# Patient Record
Sex: FEMALE | Race: WHITE | Employment: PART TIME | URBAN - METROPOLITAN AREA
[De-identification: names, ages, dates, MRNs, and addresses within clinical notes are randomized per-mention and may not be internally consistent; named-entity substitution may affect disease eponyms.]

---
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
## 2022-03-19 PROBLEM — F50.02 ANOREXIA NERVOSA, BINGE EATING/PURGING TYPE: Status: ACTIVE | Noted: 2020-10-28

## 2022-03-19 PROBLEM — K76.0 FATTY LIVER: Status: ACTIVE | Noted: 2020-10-28

## 2022-03-20 PROBLEM — F32.A ADOLESCENT DEPRESSION: Status: ACTIVE | Noted: 2020-10-28

## 2022-03-20 PROBLEM — K80.20 GALLSTONES: Status: ACTIVE | Noted: 2020-10-28

## 2022-03-30 ENCOUNTER — APPOINTMENT (OUTPATIENT)
Dept: ULTRASOUND IMAGING | Age: 22
End: 2022-03-30
Attending: PHYSICIAN ASSISTANT
Payer: MEDICAID

## 2022-03-30 ENCOUNTER — HOSPITAL ENCOUNTER (EMERGENCY)
Age: 22
Discharge: HOME | End: 2022-03-31
Attending: STUDENT IN AN ORGANIZED HEALTH CARE EDUCATION/TRAINING PROGRAM
Payer: MEDICAID

## 2022-03-30 ENCOUNTER — APPOINTMENT (OUTPATIENT)
Dept: CT IMAGING | Age: 22
End: 2022-03-30
Attending: PHYSICIAN ASSISTANT
Payer: MEDICAID

## 2022-03-30 VITALS
OXYGEN SATURATION: 99 % | HEART RATE: 87 BPM | DIASTOLIC BLOOD PRESSURE: 67 MMHG | WEIGHT: 145.06 LBS | RESPIRATION RATE: 16 BRPM | BODY MASS INDEX: 26.69 KG/M2 | SYSTOLIC BLOOD PRESSURE: 105 MMHG | TEMPERATURE: 98.4 F | HEIGHT: 62 IN

## 2022-03-30 DIAGNOSIS — R79.89 ELEVATED LFTS: ICD-10-CM

## 2022-03-30 DIAGNOSIS — R16.1 SPLENOMEGALY: ICD-10-CM

## 2022-03-30 DIAGNOSIS — B27.90 INFECTIOUS MONONUCLEOSIS WITHOUT COMPLICATION, INFECTIOUS MONONUCLEOSIS DUE TO UNSPECIFIED ORGANISM: Primary | ICD-10-CM

## 2022-03-30 LAB
ALBUMIN SERPL-MCNC: 3.3 G/DL (ref 3.5–5)
ALBUMIN/GLOB SERPL: 1 {RATIO} (ref 1.1–2.2)
ALP SERPL-CCNC: 140 U/L (ref 45–117)
ALT SERPL-CCNC: 176 U/L (ref 12–78)
ANION GAP SERPL CALC-SCNC: 6 MMOL/L (ref 5–15)
APPEARANCE UR: ABNORMAL
AST SERPL-CCNC: 129 U/L (ref 15–37)
BACTERIA URNS QL MICRO: ABNORMAL /HPF
BASOPHILS # BLD: 0.1 K/UL (ref 0–0.1)
BASOPHILS NFR BLD: 1 % (ref 0–1)
BILIRUB SERPL-MCNC: 1 MG/DL (ref 0.2–1)
BILIRUB UR QL CFM: NEGATIVE
BUN SERPL-MCNC: 7 MG/DL (ref 6–20)
BUN/CREAT SERPL: 13 (ref 12–20)
CALCIUM SERPL-MCNC: 8.6 MG/DL (ref 8.5–10.1)
CHLORIDE SERPL-SCNC: 107 MMOL/L (ref 97–108)
CO2 SERPL-SCNC: 27 MMOL/L (ref 21–32)
COLOR UR: ABNORMAL
COMMENT, HOLDF: NORMAL
CREAT SERPL-MCNC: 0.56 MG/DL (ref 0.55–1.02)
DIFFERENTIAL METHOD BLD: ABNORMAL
EOSINOPHIL # BLD: 0 K/UL (ref 0–0.4)
EOSINOPHIL NFR BLD: 0 % (ref 0–7)
EPITH CASTS URNS QL MICRO: ABNORMAL /LPF
ERYTHROCYTE [DISTWIDTH] IN BLOOD BY AUTOMATED COUNT: 12.5 % (ref 11.5–14.5)
GLOBULIN SER CALC-MCNC: 3.4 G/DL (ref 2–4)
GLUCOSE SERPL-MCNC: 81 MG/DL (ref 65–100)
GLUCOSE UR STRIP.AUTO-MCNC: NEGATIVE MG/DL
HCG SERPL QL: NEGATIVE
HCT VFR BLD AUTO: 36.7 % (ref 35–47)
HGB BLD-MCNC: 12.4 G/DL (ref 11.5–16)
HGB UR QL STRIP: NEGATIVE
IMM GRANULOCYTES # BLD AUTO: 0 K/UL
IMM GRANULOCYTES NFR BLD AUTO: 0 %
KETONES UR QL STRIP.AUTO: ABNORMAL MG/DL
LEUKOCYTE ESTERASE UR QL STRIP.AUTO: ABNORMAL
LIPASE SERPL-CCNC: 84 U/L (ref 73–393)
LYMPHOCYTES # BLD: 5.2 K/UL (ref 0.8–3.5)
LYMPHOCYTES NFR BLD: 70 % (ref 12–49)
MCH RBC QN AUTO: 30.6 PG (ref 26–34)
MCHC RBC AUTO-ENTMCNC: 33.8 G/DL (ref 30–36.5)
MCV RBC AUTO: 90.6 FL (ref 80–99)
MONOCYTES # BLD: 0.4 K/UL (ref 0–1)
MONOCYTES NFR BLD: 6 % (ref 5–13)
MUCOUS THREADS URNS QL MICRO: ABNORMAL /LPF
MYELOCYTES NFR BLD MANUAL: 1 %
NEUTS BAND NFR BLD MANUAL: 1 % (ref 0–6)
NEUTS SEG # BLD: 1.6 K/UL (ref 1.8–8)
NEUTS SEG NFR BLD: 21 % (ref 32–75)
NITRITE UR QL STRIP.AUTO: NEGATIVE
NRBC # BLD: 0 K/UL (ref 0–0.01)
NRBC BLD-RTO: 0 PER 100 WBC
PH UR STRIP: 6 [PH] (ref 5–8)
PLATELET # BLD AUTO: 178 K/UL (ref 150–400)
PMV BLD AUTO: 11.4 FL (ref 8.9–12.9)
POTASSIUM SERPL-SCNC: 3.4 MMOL/L (ref 3.5–5.1)
PROT SERPL-MCNC: 6.7 G/DL (ref 6.4–8.2)
PROT UR STRIP-MCNC: 30 MG/DL
RBC # BLD AUTO: 4.05 M/UL (ref 3.8–5.2)
RBC #/AREA URNS HPF: ABNORMAL /HPF (ref 0–5)
RBC MORPH BLD: ABNORMAL
SAMPLES BEING HELD,HOLD: NORMAL
SODIUM SERPL-SCNC: 140 MMOL/L (ref 136–145)
SP GR UR REFRACTOMETRY: 1.02 (ref 1–1.03)
UROBILINOGEN UR QL STRIP.AUTO: 1 EU/DL (ref 0.2–1)
WBC # BLD AUTO: 7.4 K/UL (ref 3.6–11)
WBC MORPH BLD: ABNORMAL
WBC URNS QL MICRO: ABNORMAL /HPF (ref 0–4)

## 2022-03-30 PROCEDURE — 74011250637 HC RX REV CODE- 250/637: Performed by: PHYSICIAN ASSISTANT

## 2022-03-30 PROCEDURE — 85025 COMPLETE CBC W/AUTO DIFF WBC: CPT

## 2022-03-30 PROCEDURE — 76705 ECHO EXAM OF ABDOMEN: CPT

## 2022-03-30 PROCEDURE — 86308 HETEROPHILE ANTIBODY SCREEN: CPT

## 2022-03-30 PROCEDURE — 74011250636 HC RX REV CODE- 250/636: Performed by: PHYSICIAN ASSISTANT

## 2022-03-30 PROCEDURE — 99285 EMERGENCY DEPT VISIT HI MDM: CPT

## 2022-03-30 PROCEDURE — 96361 HYDRATE IV INFUSION ADD-ON: CPT

## 2022-03-30 PROCEDURE — 36415 COLL VENOUS BLD VENIPUNCTURE: CPT

## 2022-03-30 PROCEDURE — 84703 CHORIONIC GONADOTROPIN ASSAY: CPT

## 2022-03-30 PROCEDURE — 96360 HYDRATION IV INFUSION INIT: CPT

## 2022-03-30 PROCEDURE — 81001 URINALYSIS AUTO W/SCOPE: CPT

## 2022-03-30 PROCEDURE — 83690 ASSAY OF LIPASE: CPT

## 2022-03-30 PROCEDURE — 74177 CT ABD & PELVIS W/CONTRAST: CPT

## 2022-03-30 PROCEDURE — 80053 COMPREHEN METABOLIC PANEL: CPT

## 2022-03-30 RX ORDER — ACETAMINOPHEN 325 MG/1
650 TABLET ORAL
Status: COMPLETED | OUTPATIENT
Start: 2022-03-30 | End: 2022-03-30

## 2022-03-30 RX ADMIN — ACETAMINOPHEN 650 MG: 325 TABLET ORAL at 23:50

## 2022-03-30 RX ADMIN — SODIUM CHLORIDE 1000 ML: 9 INJECTION, SOLUTION INTRAVENOUS at 23:50

## 2022-03-30 NOTE — LETTER
[address]  [facility] EMERGENCY DEPT  [address] [id number]  [phone]    Work/School Note    Date: 3/30/2022    To Whom It May concern:    Nikolai Christianson was seen and treated today in the emergency room by the following provider(s):  Attending Provider: Jaida Riley MD  Physician Assistant: Deidre Conner. Nikolai Christianson should not return to gym class or sport until cleared by physician.     Sincerely,            Joann Del Valle PA-C

## 2022-03-30 NOTE — ED TRIAGE NOTES
Pt presents to ER with c/o RLQ abdominal pain x2 days with n/v/d. Pt reports recent GI illness 2 weeks ago that resolved, but has no returned and the abdominal pain is new. Pt has chills and fever x2 days. Denies urinary sx. Pt still has appendix. Seen at [facility] and had bloodwork done with NL urine and CBC, K+ 2.9.

## 2022-03-30 NOTE — LETTER
[address]  [facility] EMERGENCY DEPT  [address] [phone]  [phone]    Work/School Note    Date: 3/30/2022    To Whom It May concern:    Dylon Humphreys was seen and treated today in the emergency room by the following provider(s):  Attending Provider: Jordan Santiago MD  Physician Assistant: Deidre Kessler. Dylon Humphreys may return to work on 4/11/2022.     Sincerely,          Dee Dee Mcdonald PA-C

## 2022-03-31 LAB — HETEROPH AB BLD QL IA: POSITIVE

## 2022-03-31 PROCEDURE — 74011000636 HC RX REV CODE- 636: Performed by: RADIOLOGY

## 2022-03-31 RX ADMIN — IOPAMIDOL 100 ML: 755 INJECTION, SOLUTION INTRAVENOUS at 00:07

## 2022-03-31 NOTE — DISCHARGE INSTRUCTIONS
You should not perform in any contact sports or attend gym class until you have been cleared by your pediatrician given your enlarged spleen on your CT scan. If you start having onset of left upper quadrant abdominal pain, worsening abdominal pain, nausea or vomiting, please come to ED for evaluation. If you are involved in an MVA, please come to ED for evaluation.

## 2022-03-31 NOTE — ED PROVIDER NOTES
Leeanne Saba is a 25 y.o. female without major PMhx who presents to ED with cc of 6/10 abdominal pain, right-sided X 2 days. Patient endorses having had nausea and vomiting last week, improvement through the weekend and then return of symptoms over the past 2 days. States she started then with nausea 2 days ago and now notes right-sided abdominal pain. Endorses diarrhea. Endorses also having had a fever at home 2 days ago, had none yesterday. Denies nausea at this time. Mother endorses history of known gallstones secondary to eating disorder 3 years prior. Patient denies history of appendectomy or cholecystectomy. Denies abnormal vaginal bleeding or vaginal discharge. Pt denies additional symptoms of cough, congestion, CP, SOB. PMHx: Reviewed, as below. PSHx: Reviewed, as below. PCP: Moi Dempsey MD    There are no other complaints verbalized at this time. No past medical history on file. No past surgical history on file. No family history on file. Social History     Socioeconomic History    Marital status: SINGLE     Spouse name: Not on file    Number of children: Not on file    Years of education: Not on file    Highest education level: Not on file   Occupational History    Not on file   Tobacco Use    Smoking status: Never Smoker    Smokeless tobacco: Never Used   Substance and Sexual Activity    Alcohol use: No    Drug use: Never    Sexual activity: Never   Other Topics Concern    Not on file   Social History Narrative    Not on file     Social Determinants of Health     Financial Resource Strain:     Difficulty of Paying Living Expenses: Not on file   Food Insecurity:     Worried About Running Out of Food in the Last Year: Not on file    Nataliya of Food in the Last Year: Not on file   Transportation Needs:     Lack of Transportation (Medical): Not on file    Lack of Transportation (Non-Medical):  Not on file   Physical Activity:     Days of Exercise per Week: Not on file    Minutes of Exercise per Session: Not on file   Stress:     Feeling of Stress : Not on file   Social Connections:     Frequency of Communication with Friends and Family: Not on file    Frequency of Social Gatherings with Friends and Family: Not on file    Attends Spiritism Services: Not on file    Active Member of [address] [place] [address] [employer] or Organizations: Not on file    Attends Club or Organization Meetings: Not on file    Marital Status: Not on file   Intimate Partner Violence:     Fear of Current or Ex-Partner: Not on file    Emotionally Abused: Not on file    Physically Abused: Not on file    Sexually Abused: Not on file   Housing Stability:     Unable to Pay for Housing in the Last Year: Not on file    Number of [place] in the Last Year: Not on file    Unstable Housing in the Last Year: Not on file         ALLERGIES: Patient has no known allergies. Review of Systems   Constitutional: Negative for fever. HENT: Negative for congestion. Respiratory: Negative for cough and shortness of breath. Cardiovascular: Negative for chest pain. Gastrointestinal: Positive for abdominal pain, diarrhea, nausea and vomiting. Negative for constipation. Genitourinary: Negative for dysuria, frequency, vaginal bleeding and vaginal discharge. All other systems reviewed and are negative. Vitals:    03/30/22 1845 03/30/22 2144 03/30/22 2353   BP: 106/74 97/63 105/67   Pulse: 100 100 87   Resp: 17 16 16   Temp: 99.5 °F (37.5 °C) 98.1 °F (36.7 °C) 98.4 °F (36.9 °C)   SpO2: 98% 98% 99%   Weight: 65.8 kg (145 lb 1 oz)     Height: 5' 2\" (1.575 m)              Physical Exam  Vitals and nursing note reviewed. Constitutional:       Appearance: Normal appearance. She is not diaphoretic. HENT:      Head: Atraumatic.       Right Ear: External ear normal.      Left Ear: External ear normal.   Eyes:      Conjunctiva/sclera: Conjunctivae normal.   Cardiovascular:      Rate and Rhythm: Normal rate and regular rhythm. Heart sounds: Normal heart sounds. No murmur heard. No friction rub. No gallop. Pulmonary:      Effort: No respiratory distress. Breath sounds: Normal breath sounds. No stridor. No wheezing, rhonchi or rales. Abdominal:      General: Bowel sounds are normal. There is no distension. Palpations: Abdomen is soft. Tenderness: There is abdominal tenderness in the right upper quadrant, right lower quadrant, epigastric area, periumbilical area and suprapubic area. There is no guarding or rebound. Hernia: No hernia is present. Musculoskeletal:         General: No swelling. Cervical back: Normal range of motion. No rigidity. Skin:     General: Skin is warm and dry. Neurological:      Mental Status: She is alert and oriented to person, place, and time. Mental status is at baseline.           MDM  Number of Diagnoses or Management Options     Amount and/or Complexity of Data Reviewed  Clinical lab tests: ordered and reviewed  Tests in the radiology section of CPT®: ordered and reviewed  Obtain history from someone other than the patient: yes (Mother)  Discuss the patient with other providers: yes (Dr Johan Lee, ED attending)           Procedures               VITAL SIGNS:  Vitals:    03/30/22 1845 03/30/22 2144 03/30/22 2353   BP: 106/74 97/63 105/67   Pulse: 100 100 87   Resp: 17 16 16   Temp: 99.5 °F (37.5 °C) 98.1 °F (36.7 °C) 98.4 °F (36.9 °C)   SpO2: 98% 98% 99%   Weight: 65.8 kg (145 lb 1 oz)     Height: 5' 2\" (1.575 m)           LABS:  Recent Results (from the past 24 hour(s))   CBC WITH AUTOMATED DIFF    Collection Time: 03/30/22  7:51 PM   Result Value Ref Range    WBC 7.4 3.6 - 11.0 K/uL    RBC 4.05 3.80 - 5.20 M/uL    HGB 12.4 11.5 - 16.0 g/dL    HCT 36.7 35.0 - 47.0 %    MCV 90.6 80.0 - 99.0 FL    MCH 30.6 26.0 - 34.0 PG    MCHC 33.8 30.0 - 36.5 g/dL    RDW 12.5 11.5 - 14.5 %    PLATELET 280 591 - 135 K/uL    MPV 11.4 8.9 - 12.9 FL    NRBC 0.0 0 PER 100 WBC    ABSOLUTE NRBC 0.00 0.00 - 0.01 K/uL    NEUTROPHILS 21 (L) 32 - 75 %    BAND NEUTROPHILS 1 0 - 6 %    LYMPHOCYTES 70 (H) 12 - 49 %    MONOCYTES 6 5 - 13 %    EOSINOPHILS 0 0 - 7 %    BASOPHILS 1 0 - 1 %    MYELOCYTES 1 (H) 0 %    IMMATURE GRANULOCYTES 0 %    ABS. NEUTROPHILS 1.6 (L) 1.8 - 8.0 K/UL    ABS. LYMPHOCYTES 5.2 (H) 0.8 - 3.5 K/UL    ABS. MONOCYTES 0.4 0.0 - 1.0 K/UL    ABS. EOSINOPHILS 0.0 0.0 - 0.4 K/UL    ABS. BASOPHILS 0.1 0.0 - 0.1 K/UL    ABS. IMM. GRANS. 0.0 K/UL    DF MANUAL      RBC COMMENTS NORMOCYTIC, NORMOCHROMIC      WBC COMMENTS ATYPICAL LYMPHOCYTES PRESENT     METABOLIC PANEL, COMPREHENSIVE    Collection Time: 03/30/22  7:51 PM   Result Value Ref Range    Sodium 140 136 - 145 mmol/L    Potassium 3.4 (L) 3.5 - 5.1 mmol/L    Chloride 107 97 - 108 mmol/L    CO2 27 21 - 32 mmol/L    Anion gap 6 5 - 15 mmol/L    Glucose 81 65 - 100 mg/dL    BUN 7 6 - 20 MG/DL    Creatinine 0.56 0.55 - 1.02 MG/DL    BUN/Creatinine ratio 13 12 - 20      GFR est AA >60 >60 ml/min/1.73m2    GFR est non-AA >60 >60 ml/min/1.73m2    Calcium 8.6 8.5 - 10.1 MG/DL    Bilirubin, total 1.0 0.2 - 1.0 MG/DL    ALT (SGPT) 176 (H) 12 - 78 U/L    AST (SGOT) 129 (H) 15 - 37 U/L    Alk.  phosphatase 140 (H) 45 - 117 U/L    Protein, total 6.7 6.4 - 8.2 g/dL    Albumin 3.3 (L) 3.5 - 5.0 g/dL    Globulin 3.4 2.0 - 4.0 g/dL    A-G Ratio 1.0 (L) 1.1 - 2.2     URINALYSIS W/MICROSCOPIC    Collection Time: 03/30/22  7:51 PM   Result Value Ref Range    Color DARK YELLOW      Appearance CLOUDY (A) CLEAR      Specific gravity 1.025 1.003 - 1.030      pH (UA) 6.0 5.0 - 8.0      Protein 30 (A) NEG mg/dL    Glucose Negative NEG mg/dL    Ketone TRACE (A) NEG mg/dL    Blood Negative NEG      Urobilinogen 1.0 0.2 - 1.0 EU/dL    Nitrites Negative NEG      Leukocyte Esterase TRACE (A) NEG      WBC 5-10 0 - 4 /hpf    RBC 0-5 0 - 5 /hpf    Epithelial cells MODERATE (A) FEW /lpf    Bacteria 1+ (A) NEG /hpf    Mucus 3+ (A) NEG /lpf   LIPASE Collection Time: 03/30/22  7:51 PM   Result Value Ref Range    Lipase 84 73 - 393 U/L   SAMPLES BEING HELD    Collection Time: 03/30/22  7:51 PM   Result Value Ref Range    SAMPLES BEING HELD 1red     COMMENT        Add-on orders for these samples will be processed based on acceptable specimen integrity and analyte stability, which may vary by analyte. BILIRUBIN, CONFIRM    Collection Time: 03/30/22  7:51 PM   Result Value Ref Range    Bilirubin UA, confirm Negative     HCG QL SERUM    Collection Time: 03/30/22  7:51 PM   Result Value Ref Range    HCG, Ql. Negative NEG     MONONUCLEOSIS SCREEN    Collection Time: 03/30/22  7:51 PM   Result Value Ref Range    Mononucleosis screen Positive (A) NEG           IMAGING:  CT ABD PELV W CONT   Final Result      1. Splenomegaly. 2. Normal appendix. 3. Trace pelvic free fluid likely physiologic. US ABD LTD   Final Result   No acute process. Medications During Visit:  Medications   sodium chloride 0.9 % bolus infusion 1,000 mL (0 mL IntraVENous IV Completed 3/31/22 0155)   acetaminophen (TYLENOL) tablet 650 mg (650 mg Oral Given 3/30/22 2350)   iopamidoL (ISOVUE-370) 76 % injection 100 mL (100 mL IntraVENous Given 3/31/22 0007)         DECISION MAKING:    Cassi Jang is a 25 y.o. female who comes in as above. Presents afebrile and hemodynamically stable. Fluids given for rehydration, patient denies nausea during ED stay and notes improvement of symptoms. Was resting comfortably in stretcher upon time of final evaluation. Elevated LFTs noted on CMP in patient with history of cholelithiasis. RUQ US obtained demonstrating no evidence of cholecystitis or choledocholithiasis. CT demonstrates splenomegaly with normal-appearing appendix for which mono test was obtained which was noted to be positive.   Discussed treatment, pathophysiology and spread with mother and patient as well as close return precautions and recommendation to avoid activity sports given splenomegaly. Will have follow-up appointment with pediatrician and should be cleared by pediatrician prior to returning to sports or activity. I have discussed care with ED attending. Pt has been given close return precautions as well as follow up recommendations. Opportunity for questions presented. Pt and mother verbalizes their understanding and agreement with care plan. IMPRESSION:  1. Infectious mononucleosis without complication, infectious mononucleosis due to unspecified organism    2. Elevated LFTs    3. Splenomegaly        DISPOSITION:  Discharged      Discharge Medication List as of 3/31/2022  1:33 AM           Follow-up Information     Follow up With Specialties Details Why Contact Info    Kobi Bryson MD Family Medicine Schedule an appointment as soon as possible for a visit   [address] [address]  [phone]      [facility] EMERGENCY DEPT Emergency Medicine Go to  As needed, If symptoms worsen [address] [place] [address]  [phone]            The patient is asked to follow-up with their primary care provider and any other physicians as above. We have discussed strict return precautions and the patient is asked to return promptly for any increased concerns or worsening of symptoms and for return precautions regarding their symptoms today. They can return to this emergency department or any other emergency department. I have discussed with them results as above and presented opportunity for questions. They verbalize their understanding of the above and agreement with care plan. Please note that this dictation was completed with [employer], the computer voice recognition software. Quite often unanticipated grammatical, syntax, homophones, and other interpretive errors are inadvertently transcribed by the computer software. Please disregard these errors. Please excuse any errors that have escaped final proofreading.

## 2022-05-23 ENCOUNTER — OFFICE VISIT (OUTPATIENT)
Dept: FAMILY MEDICINE CLINIC | Age: 22
End: 2022-05-23
Payer: MEDICAID

## 2022-05-23 VITALS
HEART RATE: 85 BPM | BODY MASS INDEX: 27.36 KG/M2 | DIASTOLIC BLOOD PRESSURE: 66 MMHG | SYSTOLIC BLOOD PRESSURE: 96 MMHG | TEMPERATURE: 99 F | WEIGHT: 149.6 LBS | OXYGEN SATURATION: 98 %

## 2022-05-23 DIAGNOSIS — J02.9 SORE THROAT: Primary | ICD-10-CM

## 2022-05-23 LAB
S PYO AG THROAT QL: NEGATIVE
VALID INTERNAL CONTROL?: YES

## 2022-05-23 PROCEDURE — 87880 STREP A ASSAY W/OPTIC: CPT | Performed by: STUDENT IN AN ORGANIZED HEALTH CARE EDUCATION/TRAINING PROGRAM

## 2022-05-23 PROCEDURE — 99213 OFFICE O/P EST LOW 20 MIN: CPT | Performed by: STUDENT IN AN ORGANIZED HEALTH CARE EDUCATION/TRAINING PROGRAM

## 2022-05-23 RX ORDER — HYDROXYZINE 25 MG/1
TABLET, FILM COATED ORAL
COMMUNITY
Start: 2022-05-18

## 2022-05-23 RX ORDER — NORETHINDRONE ACETATE/ETHINYL ESTRADIOL AND FERROUS FUMARATE 1MG-20(24)
1 KIT ORAL DAILY
COMMUNITY
Start: 2022-04-28

## 2022-05-23 RX ORDER — METHYLPREDNISOLONE 4 MG/1
TABLET ORAL
COMMUNITY
Start: 2022-05-18

## 2022-05-23 NOTE — PROGRESS NOTES
Chief Complaint   Patient presents with    Ear Pain    Cough    Sore Throat     Visit Vitals  BP 96/66 (BP 1 Location: Left upper arm, BP Patient Position: Sitting, BP Cuff Size: Adult)   Pulse 85   Temp 99 °F (37.2 °C) (Oral)   Wt 149 lb 9.6 oz (67.9 kg)   SpO2 98%   BMI 27.36 kg/m²

## 2022-05-23 NOTE — PATIENT INSTRUCTIONS
Good news! No Strep Infection. If your symptoms do not resolve in the next 1-2 weeks, give us a call. Meanwhile, drink plenty of water (at least 64oz/day) and take guaifenesin over the counter to help with congestion. Viral Infections in Teens: Care Instructions  Overview     You don't feel well, but it's not clear what's causing it. You may have a viral infection. Viruses cause many illnesses, such as the common cold, influenza, fever, and rashes. Viruses also cause the diarrhea, nausea, and vomiting that are symptoms of a stomach infection. You may wonder if antibiotic medicines could make you feel better. But antibiotics only treat infections caused by bacteria. They don't work on viruses. The good news is that viral infections usually aren't serious. Most will go away in a few days without medical treatment. In the meantime, there are a few things you can do to make yourself more comfortable. Follow-up care is a key part of your treatment and safety. Be sure to make and go to all appointments, and call your doctor if you are having problems. It's also a good idea to know your test results and keep a list of the medicines you take. How can you care for yourself at home? · Get plenty of rest if you feel tired. · Take an over-the-counter pain medicine if needed, such as acetaminophen (Tylenol), ibuprofen (Advil, Motrin), or naproxen (Aleve). Be safe with medicines. Read and follow all instructions on the label. No one younger than 20 should take aspirin. It has been linked to Reye syndrome, a serious illness. · Be careful when taking over-the-counter cold or flu medicines and Tylenol at the same time. Many of these medicines have acetaminophen, which is Tylenol. Read the labels to make sure that you are not taking more than the recommended dose. Too much acetaminophen (Tylenol) can be harmful. · Drink plenty of fluids.  If you have kidney, heart, or liver disease and have to limit fluids, talk with your doctor before you increase the amount of fluids you drink. · Stay home from school, work, and other public places while you have a fever. When should you call for help? Call your doctor now or seek immediate medical care if:    · You feel like you are getting sicker.     · You have a new or higher fever.     · You have a new or worse rash.     · You have symptoms of dehydration, such as:  ? Dry eyes and a dry mouth. ? Passing only a little urine. ? Feeling thirstier than normal.   Watch closely for changes in your health, and be sure to contact your doctor if:    · You do not get better as expected. Where can you learn more? Go to http://www.gray.com/  Enter G516 in the search box to learn more about \"Viral Infections in Teens: Care Instructions. \"  Current as of: July 1, 2021               Content Version: 13.2  © 2582-4291 [employer], Incorporated. Care instructions adapted under license by [employer] (which disclaims liability or warranty for this information). If you have questions about a medical condition or this instruction, always ask your healthcare professional. [address] any warranty or liability for your use of this information.

## 2022-05-24 NOTE — PROGRESS NOTES
Evens Saba is an 25 y.o. female who presents for sore throat and cough    #cough, sore throat  - [facility]  - given steroid pack and antihistamine  - still taking steroid pack  - developed sore throat after visit to PF last Thursday  - congestion  - cough, productive  - no fevers, sinus tenderness, wheezing, shortness of breath, malaise  - rash/hives resolved  - ear pain bilaterally, most on R    Allergies - reviewed:   No Known Allergies      Medications - reviewed:   Current Outpatient Medications   Medication Sig    sertraline (ZOLOFT) 100 mg tablet Take 1 Tablet by mouth daily.  methylPREDNISolone (MEDROL DOSEPACK) 4 mg tablet TAKE AS DIRECTED ON PACKAGE    Zoran 24 Fe 1 mg-20 mcg (24)/75 mg (4) tab Take 1 Tablet by mouth daily.  hydrOXYzine HCL (ATARAX) 25 mg tablet TAKE 1 TABLET BY MOUTH FOUR TIMES DAILY AS NEEDED FOR ITCHING     No current facility-administered medications for this visit. Past Medical History - reviewed: Childhood asthma vs RAD, no current issues with pulmonary system      Past Surgical History - reviewed: none      Family History - reviewed:  No family history on file.       Immunizations - reviewed:   Immunization History   Administered Date(s) Administered    COVID-19, J&J, PF, 0.5 mL Dose 04/07/2021    DTaP 03/03/2003, 05/05/2003, 06/30/2003, 07/19/2004, 08/23/2007    HPV 06/13/2016, 08/24/2016, 03/07/2018    Hep A Vaccine 08/23/2007, 08/29/2008    Hep B Vaccine 03/03/2003, 05/05/2003, 01/12/2004    Hib 03/03/2003, 05/05/2003, 01/12/2004    Influenza Vaccine 10/07/2016    MMR 03/30/2004, 08/23/2007    Meningococcal (MCV4O) Vaccine 10/28/2020    Meningococcal ACWY Vaccine 03/06/2015    Pneumococcal Conjugate (PCV-7) 03/03/2003, 05/05/2003, 10/15/2003, 01/12/2004    Poliovirus vaccine 03/03/2003, 05/05/2003, 07/19/2004, 08/23/2007    Tdap 03/06/2015    Varicella Virus Vaccine 03/30/2004, 08/23/2007       ROS  Review of Systems Constitutional: Negative for chills, fever and malaise/fatigue. HENT: Positive for congestion, ear pain and sore throat. Negative for sinus pain. Respiratory: Positive for cough and sputum production. Negative for shortness of breath and wheezing. Cardiovascular: Negative for chest pain and palpitations. Gastrointestinal: Negative for abdominal pain, constipation, diarrhea and vomiting. Musculoskeletal: Negative for myalgias. Skin: Positive for rash. Neurological: Negative for headaches. Physical Exam  Visit Vitals  BP 96/66 (BP 1 Location: Left upper arm, BP Patient Position: Sitting, BP Cuff Size: Adult)   Pulse 85   Temp 99 °F (37.2 °C) (Oral)   Wt 149 lb 9.6 oz (67.9 kg)   SpO2 98%   BMI 27.36 kg/m²       General: well-appearing, interactive, in no acute distress   Head: Normocephalic, atraumatic. No sinus tenderness  Ears: TMs pearly grey bilaterally, minimal fluid no bulging or erythema  Eyes: EOM grossly intact, sclera white, conjunctiva non-injected, no discharge Nose: Nasal passage WNL bilat  Throat: Oropharynx non-erythematous, no exudate;  Lymph: no cervical lymphadenopathy  Mouth: moist mucosa, good dentition   Lungs: No increased work of breathing, lungs clear to auscultation bilaterally with good air movement and without wheezes, crackles, rhonchi or diminished breath sounds  Heart: regular rate and rhythm with normal S1, S2, good peripheral perfusion   Musculoskeletal: normal gait   Neuro: alert, oriented      Assessment/Plan  1. Sore throat  Negative rapid strep. No erythema or bulging on ear exam. No sinus tenderness. No fevers. Likely mild viral illness. Offered covid testing, pt has home test and declines. Low suspicion for bacterial infection, provided counseling on signs to look for (worsening after improvement, duration 10+ days, sinus tenderness, fevers later in course) and family will be in touch if symptom changes.  Trial hydration, humidifier, guaifenesin, tea with honey.    - AMB POC RAPID STREP A        Follow-up and Dispositions    · Return in about 3 months (around 8/23/2022) for Physical.           I have discussed the diagnosis with the patient and the intended plan as seen in the above orders. Patient verbalized understanding of the plan and agrees with the plan. The patient has received an after-visit summary and questions were answered concerning future plans. I have discussed medication side effects and warnings with the patient as well. Informed patient to return to the office if new symptoms arise.         Floyd Hummel MD

## 2022-07-26 ENCOUNTER — OFFICE VISIT (OUTPATIENT)
Dept: FAMILY MEDICINE CLINIC | Age: 22
End: 2022-07-26
Payer: MEDICAID

## 2022-07-26 VITALS
WEIGHT: 157.2 LBS | DIASTOLIC BLOOD PRESSURE: 69 MMHG | TEMPERATURE: 97.8 F | HEIGHT: 62 IN | SYSTOLIC BLOOD PRESSURE: 106 MMHG | BODY MASS INDEX: 28.93 KG/M2 | OXYGEN SATURATION: 98 % | RESPIRATION RATE: 18 BRPM | HEART RATE: 83 BPM

## 2022-07-26 DIAGNOSIS — F32.A ADOLESCENT DEPRESSION: ICD-10-CM

## 2022-07-26 PROCEDURE — 99213 OFFICE O/P EST LOW 20 MIN: CPT | Performed by: STUDENT IN AN ORGANIZED HEALTH CARE EDUCATION/TRAINING PROGRAM

## 2022-07-26 RX ORDER — SERTRALINE HYDROCHLORIDE 100 MG/1
100 TABLET, FILM COATED ORAL DAILY
Qty: 120 TABLET | Refills: 2 | Status: SHIPPED | OUTPATIENT
Start: 2022-07-26

## 2022-07-26 NOTE — LETTER
7/26/2022 8:55 AM    Ms.  [facility]  [address]  [facility] [address]    Immunization History   Administered Date(s) Administered    COVID-19, J&J, (age 18y+), IM, 0.5mL 04/07/2021    DTaP 03/03/2003, 05/05/2003, 06/30/2003, 07/19/2004, 08/23/2007    HPV 06/13/2016, 08/24/2016, 03/07/2018    Hep A Vaccine 08/23/2007, 08/29/2008    Hep B Vaccine 03/03/2003, 05/05/2003, 01/12/2004    Hib 03/03/2003, 05/05/2003, 01/12/2004    Influenza Vaccine 10/07/2016    MMR 03/30/2004, 08/23/2007    Meningococcal (MCV4O) Vaccine 10/28/2020    Meningococcal ACWY Vaccine 03/06/2015    Pneumococcal Conjugate (PCV-7) 03/03/2003, 05/05/2003, 10/15/2003, 01/12/2004    Poliovirus vaccine 03/03/2003, 05/05/2003, 07/19/2004, 08/23/2007    Tdap 03/06/2015    Varicella Virus Vaccine 03/30/2004, 08/23/2007               Sincerely,      Kevin Torres MD

## 2022-07-26 NOTE — PROGRESS NOTES
Evens Byrd is an 25 y.o. female who presents for depression follow up. Depression: Pt has been having this for ~ 3 years. Stable. Has been taking Zoloft  mg PO daily. Mentioned that has good and bad days, mostly having trouble with energy and it is hard to get out of bed. Has been doing well at school and she is very excited about College. Has good family support and stated relationship with parents are good. Denies SH/HI. Has not been receiving mental therapy anymore. Will study: Communication Science. Will have a roommate. PHQ-9: 18  GAD7: 14    Allergies - reviewed:   No Known Allergies      Medications - reviewed:   Current Outpatient Medications   Medication Sig    sertraline (ZOLOFT) 100 mg tablet Take 1 Tablet by mouth in the morning. Zoran 24 Fe 1 mg-20 mcg (24)/75 mg (4) tab Take 1 Tablet by mouth daily. methylPREDNISolone (MEDROL DOSEPACK) 4 mg tablet TAKE AS DIRECTED ON PACKAGE (Patient not taking: Reported on 7/26/2022)    hydrOXYzine HCL (ATARAX) 25 mg tablet TAKE 1 TABLET BY MOUTH FOUR TIMES DAILY AS NEEDED FOR ITCHING (Patient not taking: Reported on 7/26/2022)     No current facility-administered medications for this visit. Past Medical History - reviewed:  No past medical history on file. Past Surgical History - reviewed:   No past surgical history on file.       Social History - reviewed:  Social History     Socioeconomic History    Marital status: SINGLE     Spouse name: Not on file    Number of children: Not on file    Years of education: Not on file    Highest education level: Not on file   Occupational History    Not on file   Tobacco Use    Smoking status: Never    Smokeless tobacco: Never   Substance and Sexual Activity    Alcohol use: No    Drug use: Never    Sexual activity: Never   Other Topics Concern    Not on file   Social History Narrative    Not on file     Social Determinants of Health     Financial Resource Strain: Not on file   Food Insecurity: Not on file   Transportation Needs: Not on file   Physical Activity: Not on file   Stress: Not on file   Social Connections: Not on file   Intimate Partner Violence: Not on file   Housing Stability: Not on file         Family History - reviewed:  No family history on file. Immunizations - reviewed:   Immunization History   Administered Date(s) Administered    COVID-19, J&J, (age 18y+), IM, 0.5mL 04/07/2021    DTaP 03/03/2003, 05/05/2003, 06/30/2003, 07/19/2004, 08/23/2007    HPV 06/13/2016, 08/24/2016, 03/07/2018    Hep A Vaccine 08/23/2007, 08/29/2008    Hep B Vaccine 03/03/2003, 05/05/2003, 01/12/2004    Hib 03/03/2003, 05/05/2003, 01/12/2004    Influenza Vaccine 10/07/2016    MMR 03/30/2004, 08/23/2007    Meningococcal (MCV4O) Vaccine 10/28/2020    Meningococcal ACWY Vaccine 03/06/2015    Pneumococcal Conjugate (PCV-7) 03/03/2003, 05/05/2003, 10/15/2003, 01/12/2004    Poliovirus vaccine 03/03/2003, 05/05/2003, 07/19/2004, 08/23/2007    Tdap 03/06/2015    Varicella Virus Vaccine 03/30/2004, 08/23/2007         ROS  Negative besides what is written in HPI. Physical Exam  Visit Vitals  /69 (BP 1 Location: Right upper arm, BP Patient Position: Sitting, BP Cuff Size: Adult)   Pulse 83   Temp 97.8 °F (36.6 °C) (Temporal)   Resp 18   Ht 5' 2\" (1.575 m)   Wt 157 lb 3.2 oz (71.3 kg)   LMP 07/19/2022   SpO2 98%   BMI 28.75 kg/m²       General: No acute distress. Alert. Cooperative. Head: Normocephalic. Atraumatic. Respiratory: CTAB. No w/r/r/c.  Cardiovascular: RRR. Normal S1,S2. No m/r/g. Extremities: No LE edema. Distal pulses present. Musculoskeletal: Full ROM in all extremities. Skin: Warm, dry. No rashes. Neuro: Alert and oriented. Assessment/Plan    ICD-10-CM ICD-9-CM    1. Adolescent depression  F32. A 311 - Continue Sertraline (ZOLOFT) 100 mg tablet PO daily.  - Coping mechanism advised. - Exercise advised.    - Call MD or go to ER if SH/HI.   - Try to g back for counseling/therapy. I have discussed the diagnosis with the patient and the intended plan as seen in the above orders. Patient verbalized understanding of the plan and agrees with the plan. The patient has received an after-visit summary and questions were answered concerning future plans. I have discussed medication side effects and warnings with the patient as well. Informed patient to return to the office if new symptoms arise. Pt discussed with  Dr. Adina Snellen (Attending Physician).      Noris Stanley MD  Family Medicine Resident

## 2022-07-26 NOTE — PROGRESS NOTES
Chief Complaint   Patient presents with    Follow-up     Follow-up on existing conditions. Form Completion     College forms      Visit Vitals  /69 (BP 1 Location: Right upper arm, BP Patient Position: Sitting, BP Cuff Size: Adult)   Pulse 83   Temp 97.8 °F (36.6 °C) (Temporal)   Resp 18   Ht 5' 2\" (1.575 m)   Wt 157 lb 3.2 oz (71.3 kg)   SpO2 98%   BMI 28.75 kg/m²     1. Have you been to the ER, urgent care clinic since your last visit? Hospitalized since your last visit? No    2. Have you seen or consulted any other health care providers outside of the [address] [place] [address] since your last visit? Include any pap smears or colon screening.  No

## 2022-08-11 DIAGNOSIS — F32.A ADOLESCENT DEPRESSION: ICD-10-CM

## 2022-08-15 RX ORDER — SERTRALINE HYDROCHLORIDE 100 MG/1
TABLET, FILM COATED ORAL
Qty: 90 TABLET | Refills: 0 | OUTPATIENT
Start: 2022-08-15

## 2022-12-23 DIAGNOSIS — F32.A ADOLESCENT DEPRESSION: ICD-10-CM

## 2022-12-27 RX ORDER — SERTRALINE HYDROCHLORIDE 100 MG/1
TABLET, FILM COATED ORAL
Qty: 90 TABLET | Refills: 0 | Status: SHIPPED | OUTPATIENT
Start: 2022-12-27

## 2022-12-27 NOTE — TELEPHONE ENCOUNTER
Will refill the medication for one time. Needs an appointment for future refills. Due for physical. Will forward the message to schedulers.

## 2023-02-19 ENCOUNTER — HOSPITAL ENCOUNTER (EMERGENCY)
Age: 23
Discharge: HOME | End: 2023-02-19
Attending: STUDENT IN AN ORGANIZED HEALTH CARE EDUCATION/TRAINING PROGRAM
Payer: MEDICAID

## 2023-02-19 ENCOUNTER — APPOINTMENT (OUTPATIENT)
Dept: ULTRASOUND IMAGING | Age: 23
End: 2023-02-19
Attending: NURSE PRACTITIONER
Payer: MEDICAID

## 2023-02-19 VITALS
OXYGEN SATURATION: 100 % | TEMPERATURE: 98.1 F | WEIGHT: 155 LBS | SYSTOLIC BLOOD PRESSURE: 110 MMHG | HEIGHT: 62 IN | RESPIRATION RATE: 16 BRPM | DIASTOLIC BLOOD PRESSURE: 70 MMHG | HEART RATE: 76 BPM | BODY MASS INDEX: 28.52 KG/M2

## 2023-02-19 DIAGNOSIS — N39.0 URINARY TRACT INFECTION WITH HEMATURIA, SITE UNSPECIFIED: Primary | ICD-10-CM

## 2023-02-19 DIAGNOSIS — R31.9 URINARY TRACT INFECTION WITH HEMATURIA, SITE UNSPECIFIED: Primary | ICD-10-CM

## 2023-02-19 LAB
ALBUMIN SERPL-MCNC: 3.4 G/DL (ref 3.5–5)
ALBUMIN/GLOB SERPL: 0.9 (ref 1.1–2.2)
ALP SERPL-CCNC: 35 U/L (ref 45–117)
ALT SERPL-CCNC: 24 U/L (ref 12–78)
ANION GAP SERPL CALC-SCNC: 4 MMOL/L (ref 5–15)
APPEARANCE UR: ABNORMAL
AST SERPL-CCNC: 46 U/L (ref 15–37)
BACTERIA URNS QL MICRO: ABNORMAL /HPF
BASOPHILS # BLD: 0 K/UL (ref 0–0.1)
BASOPHILS NFR BLD: 1 % (ref 0–1)
BILIRUB SERPL-MCNC: 0.3 MG/DL (ref 0.2–1)
BILIRUB UR QL: NEGATIVE
BUN SERPL-MCNC: 12 MG/DL (ref 6–20)
BUN/CREAT SERPL: 14 (ref 12–20)
CALCIUM SERPL-MCNC: 8.8 MG/DL (ref 8.5–10.1)
CHLORIDE SERPL-SCNC: 110 MMOL/L (ref 97–108)
CO2 SERPL-SCNC: 27 MMOL/L (ref 21–32)
COLOR UR: ABNORMAL
COMMENT, HOLDF: NORMAL
CREAT SERPL-MCNC: 0.85 MG/DL (ref 0.55–1.02)
DIFFERENTIAL METHOD BLD: NORMAL
EOSINOPHIL # BLD: 0.1 K/UL (ref 0–0.4)
EOSINOPHIL NFR BLD: 2 % (ref 0–7)
EPITH CASTS URNS QL MICRO: ABNORMAL /LPF
ERYTHROCYTE [DISTWIDTH] IN BLOOD BY AUTOMATED COUNT: 12.7 % (ref 11.5–14.5)
GLOBULIN SER CALC-MCNC: 3.9 G/DL (ref 2–4)
GLUCOSE SERPL-MCNC: 71 MG/DL (ref 65–100)
GLUCOSE UR STRIP.AUTO-MCNC: NEGATIVE MG/DL
HCG UR QL: NEGATIVE
HCT VFR BLD AUTO: 38.8 % (ref 35–47)
HGB BLD-MCNC: 13.1 G/DL (ref 11.5–16)
HGB UR QL STRIP: ABNORMAL
IMM GRANULOCYTES # BLD AUTO: 0 K/UL (ref 0–0.04)
IMM GRANULOCYTES NFR BLD AUTO: 0 % (ref 0–0.5)
KETONES UR QL STRIP.AUTO: ABNORMAL MG/DL
LEUKOCYTE ESTERASE UR QL STRIP.AUTO: ABNORMAL
LYMPHOCYTES # BLD: 1.7 K/UL (ref 0.8–3.5)
LYMPHOCYTES NFR BLD: 26 % (ref 12–49)
MCH RBC QN AUTO: 30.5 PG (ref 26–34)
MCHC RBC AUTO-ENTMCNC: 33.8 G/DL (ref 30–36.5)
MCV RBC AUTO: 90.2 FL (ref 80–99)
MONOCYTES # BLD: 0.3 K/UL (ref 0–1)
MONOCYTES NFR BLD: 5 % (ref 5–13)
MUCOUS THREADS URNS QL MICRO: ABNORMAL /LPF
NEUTS SEG # BLD: 4.2 K/UL (ref 1.8–8)
NEUTS SEG NFR BLD: 66 % (ref 32–75)
NITRITE UR QL STRIP.AUTO: NEGATIVE
NRBC # BLD: 0 K/UL (ref 0–0.01)
NRBC BLD-RTO: 0 PER 100 WBC
PH UR STRIP: 5.5 (ref 5–8)
PLATELET # BLD AUTO: 256 K/UL (ref 150–400)
PMV BLD AUTO: 10.4 FL (ref 8.9–12.9)
POTASSIUM SERPL-SCNC: 3.8 MMOL/L (ref 3.5–5.1)
PROT SERPL-MCNC: 7.3 G/DL (ref 6.4–8.2)
PROT UR STRIP-MCNC: 100 MG/DL
RBC # BLD AUTO: 4.3 M/UL (ref 3.8–5.2)
RBC #/AREA URNS HPF: >100 /HPF (ref 0–5)
SAMPLES BEING HELD,HOLD: NORMAL
SODIUM SERPL-SCNC: 141 MMOL/L (ref 136–145)
SP GR UR REFRACTOMETRY: >1.03 (ref 1–1.03)
UA: UC IF INDICATED,UAUC: ABNORMAL
UROBILINOGEN UR QL STRIP.AUTO: 1 EU/DL (ref 0.2–1)
WBC # BLD AUTO: 6.3 K/UL (ref 3.6–11)
WBC URNS QL MICRO: ABNORMAL /HPF (ref 0–4)

## 2023-02-19 PROCEDURE — 87086 URINE CULTURE/COLONY COUNT: CPT

## 2023-02-19 PROCEDURE — 99284 EMERGENCY DEPT VISIT MOD MDM: CPT

## 2023-02-19 PROCEDURE — 80053 COMPREHEN METABOLIC PANEL: CPT

## 2023-02-19 PROCEDURE — 81001 URINALYSIS AUTO W/SCOPE: CPT

## 2023-02-19 PROCEDURE — 36415 COLL VENOUS BLD VENIPUNCTURE: CPT

## 2023-02-19 PROCEDURE — 76830 TRANSVAGINAL US NON-OB: CPT

## 2023-02-19 PROCEDURE — 81025 URINE PREGNANCY TEST: CPT

## 2023-02-19 PROCEDURE — 85025 COMPLETE CBC W/AUTO DIFF WBC: CPT

## 2023-02-19 RX ORDER — CEPHALEXIN 500 MG/1
500 CAPSULE ORAL 4 TIMES DAILY
Qty: 28 CAPSULE | Refills: 0 | Status: SHIPPED | OUTPATIENT
Start: 2023-02-19 | End: 2023-02-22

## 2023-02-19 NOTE — Clinical Note
[address]  [facility] EMERGENCY DEPT  [id number]  [phone]    Work/School Note    Date: 2/19/2023    To Whom It May concern:    Yamilet Horvath was seen and treated today in the emergency room by the following provider(s):  Attending Provider: Codi Ryan DO  Nurse Practitioner: Mary Felix NP. Yamilet Horvath is excused from work/school on 02/19/23 and 02/20/23. She is medically clear to return to work/school on 2/21/2023.        Sincerely,          Chelsea Toney NP

## 2023-02-19 NOTE — ED NOTES
I have reviewed discharge instructions with the patient. The patient verbalized understanding. IV removed from right AC, catheter tip intact.

## 2023-02-19 NOTE — ED PROVIDER NOTES
This is a 28-year-old female who presents ambulatory to the emergency room with complaints of vaginal bleeding x4 days along with abdominal cramping and nausea. Patient states her last menstrual period was on 5 February. States she took a home pregnancy test that was negative. Denies any chest pain, shortness of breath, dizziness, nausea or vomiting, fevers or chills. Denies any urinary symptoms. No hematuria. There are no further complaints at this time. No past medical history on file. No past surgical history on file. No family history on file. Social History     Socioeconomic History    Marital status: SINGLE     Spouse name: Not on file    Number of children: Not on file    Years of education: Not on file    Highest education level: Not on file   Occupational History    Not on file   Tobacco Use    Smoking status: Never    Smokeless tobacco: Never   Substance and Sexual Activity    Alcohol use: No    Drug use: Never    Sexual activity: Never   Other Topics Concern    Not on file   Social History Narrative    Not on file     Social Determinants of Health     Financial Resource Strain: Not on file   Food Insecurity: Not on file   Transportation Needs: Not on file   Physical Activity: Not on file   Stress: Not on file   Social Connections: Not on file   Intimate Partner Violence: Not on file   Housing Stability: Not on file         ALLERGIES: Patient has no known allergies. Review of Systems   Constitutional:  Negative for appetite change, chills, diaphoresis, fatigue and fever. HENT:  Negative for congestion, sore throat and trouble swallowing. Eyes:  Negative for photophobia and redness. Respiratory:  Negative for chest tightness and shortness of breath. Cardiovascular:  Negative for chest pain. Gastrointestinal:  Negative for abdominal distention and abdominal pain. Endocrine: Negative. Genitourinary:  Positive for hematuria and vaginal bleeding.  Negative for difficulty urinating, flank pain, frequency and urgency. Musculoskeletal:  Negative for back pain, neck pain and neck stiffness. Skin:  Negative for color change, pallor, rash and wound. Allergic/Immunologic: Negative. Neurological:  Negative for dizziness, speech difficulty, weakness and headaches. Hematological:  Does not bruise/bleed easily. Psychiatric/Behavioral:  Negative for behavioral problems. The patient is not nervous/anxious. Vitals:    02/19/23 1441   BP: 110/70   Pulse: 76   Resp: 16   Temp: 98.1 °F (36.7 °C)   SpO2: 100%   Weight: 70.3 kg (155 lb)   Height: 5' 2\" (1.575 m)            Physical Exam  Vitals and nursing note reviewed. Constitutional:       General: She is not in acute distress. Appearance: Normal appearance. She is well-developed. She is not ill-appearing. HENT:      Head: Normocephalic and atraumatic. Right Ear: External ear normal.      Left Ear: External ear normal.      Nose: Nose normal. No congestion. Mouth/Throat:      Mouth: Mucous membranes are moist.   Eyes:      General:         Right eye: No discharge. Left eye: No discharge. Conjunctiva/sclera: Conjunctivae normal.      Pupils: Pupils are equal, round, and reactive to light. Neck:      Vascular: No JVD. Trachea: No tracheal deviation. Cardiovascular:      Rate and Rhythm: Normal rate and regular rhythm. Pulses: Normal pulses. Heart sounds: Normal heart sounds. No murmur heard. No gallop. Pulmonary:      Effort: Pulmonary effort is normal. No respiratory distress. Breath sounds: Normal breath sounds. Chest:      Chest wall: No tenderness. Abdominal:      General: Bowel sounds are normal. There is no distension. Palpations: Abdomen is soft. Tenderness: There is no abdominal tenderness. There is no guarding or rebound. Genitourinary:     Comments: Negative urinary symptoms of urgency and frequency, positive hematuria. Musculoskeletal:         General: No tenderness. Normal range of motion. Cervical back: Normal range of motion and neck supple. No tenderness. Skin:     General: Skin is warm and dry. Capillary Refill: Capillary refill takes less than 2 seconds. Coloration: Skin is not pale. Findings: No erythema or rash. Neurological:      General: No focal deficit present. Mental Status: She is alert and oriented to person, place, and time. Motor: No weakness. Coordination: Coordination normal.   Psychiatric:         Mood and Affect: Mood normal.         Behavior: Behavior normal.         Thought Content: Thought content normal.         Judgment: Judgment normal.        Medical Decision Making  28-year-old female with acute onset vaginal bleeding. Last menstrual cycle was on February 5. Denies any abdominal pain. No urinary symptoms of urgency or frequency, hematuria. On examination patient with lower abdominal discomfort. Suspect a urinary tract infection versus pregnancy versus ectopic pregnancy. Will send urinalysis and laboratory data for confirmation. 1. Previous notes (external to Emergency room) were reviewed: chart review    2. History was obtained by: patient    3. Chronic medical issues affecting this visit:   No past medical history on file. No past surgical history on file. 4. I ordered the following tests, followed by review of final reads by lab and radiology: cbc, cmp, urinalysis, urine pregnancy test.    5. I considered ordering: as above    6. Medical intervention: imaging and lab data      Surgical intervention: none indicated at this time. 7. Social determinants of health: none    8 Final diagnosis: urinary tract infection. Medications prescribed: keflex    9. Disposition:   Home and follow up with PCP. Return to the emergency room with worsening symptoms.     Problems Addressed:  Urinary tract infection with hematuria, site unspecified: acute illness or injury    Amount and/or Complexity of Data Reviewed  Independent Historian: parent  Labs: ordered. Decision-making details documented in ED Course. Radiology: ordered. Decision-making details documented in ED Course. ECG/medicine tests: ordered. Risk  Prescription drug management. Labs Reviewed   URINALYSIS W/ REFLEX CULTURE - Abnormal; Notable for the following components:       Result Value    Appearance TURBID (*)     Specific gravity >1.030 (*)     Protein 100 (*)     Ketone TRACE (*)     Blood LARGE (*)     Leukocyte Esterase SMALL (*)     RBC >100 (*)     Epithelial cells MANY (*)     Bacteria 3+ (*)     UA:UC IF INDICATED URINE CULTURE ORDERED (*)     Mucus 2+ (*)     All other components within normal limits   METABOLIC PANEL, COMPREHENSIVE - Abnormal; Notable for the following components:    Chloride 110 (*)     Anion gap 4 (*)     AST (SGOT) 46 (*)     Alk. phosphatase 35 (*)     Albumin 3.4 (*)     A-G Ratio 0.9 (*)     All other components within normal limits   CULTURE, URINE   CBC WITH AUTOMATED DIFF   SAMPLES BEING HELD   HCG URINE, QL. - POC   SAMPLE TO BLOOD BANK     No results found. 1654: Pt has been reexamined. Pt has no new complaints, changes or physical findings. Care plan outlined and precautions discussed. All available results were reviewed with pt. All medications were reviewed with pt. All of pt's questions and concerns were addressed. Pt agrees to F/U as instructed and agrees to return to ED upon further deterioration. Pt is ready to go home. Candie Vilchis NP    Please note that this dictation was completed with Cortex, the computer voice recognition software. Quite often unanticipated grammatical, syntax, homophones, and other interpretive errors are inadvertently transcribed by the computer software. Please disregard these errors. Please excuse any errors that have escaped final proofreading. Thank you.     Procedures

## 2023-02-19 NOTE — ED TRIAGE NOTES
Pt ambulatory to ED c/o vaginal bleeding x 4 days with abdominal cramping and nausea. Home pregnancy test negative. LMP 2/5/23.

## 2023-02-20 LAB
BACTERIA SPEC CULT: NORMAL
CC UR VC: NORMAL
SERVICE CMNT-IMP: NORMAL

## 2023-02-22 ENCOUNTER — OFFICE VISIT (OUTPATIENT)
Dept: FAMILY MEDICINE CLINIC | Age: 23
End: 2023-02-22
Payer: MEDICAID

## 2023-02-22 ENCOUNTER — TELEPHONE (OUTPATIENT)
Dept: FAMILY MEDICINE CLINIC | Age: 23
End: 2023-02-22

## 2023-02-22 VITALS
HEART RATE: 93 BPM | OXYGEN SATURATION: 98 % | SYSTOLIC BLOOD PRESSURE: 99 MMHG | WEIGHT: 167 LBS | BODY MASS INDEX: 30.54 KG/M2 | RESPIRATION RATE: 16 BRPM | DIASTOLIC BLOOD PRESSURE: 69 MMHG | TEMPERATURE: 98.5 F

## 2023-02-22 DIAGNOSIS — N39.0 URINARY TRACT INFECTION WITHOUT HEMATURIA, SITE UNSPECIFIED: ICD-10-CM

## 2023-02-22 DIAGNOSIS — R10.2 PELVIC PAIN: Primary | ICD-10-CM

## 2023-02-22 DIAGNOSIS — R11.2 NAUSEA AND VOMITING IN ADULT: ICD-10-CM

## 2023-02-22 RX ORDER — ONDANSETRON 4 MG/1
4 TABLET, ORALLY DISINTEGRATING ORAL
Qty: 115 TABLET | Refills: 0 | Status: SHIPPED | OUTPATIENT
Start: 2023-02-22

## 2023-02-22 RX ORDER — SULFAMETHOXAZOLE AND TRIMETHOPRIM 800; 160 MG/1; MG/1
1 TABLET ORAL 2 TIMES DAILY
Qty: 6 TABLET | Refills: 0 | Status: SHIPPED | OUTPATIENT
Start: 2023-02-22 | End: 2023-02-25

## 2023-02-22 NOTE — TELEPHONE ENCOUNTER
----- Message from Clementineemmymary Agudelo sent at 2/22/2023  4:04 PM EST -----  Subject: Message to Provider    QUESTIONS  Information for Provider? Pt was seen in ED on Sunday and diagnosed with   UTI and placed on Keflex Monday evening and all day yesterday with several   bouts of nausea and vomiting Also now has had break through heavy   menstrual bleeding and lower Abd pain Mom is concerned that maybe   something else is going on   ---------------------------------------------------------------------------  --------------  CALL BACK INFO  [phone]; OK to leave message on voicemail  ---------------------------------------------------------------------------  --------------  SCRIPT ANSWERS  Relationship to Patient? Parent  Representative Name? Hua Penaloza   Additional information verified (besides Name and Date of Birth)? Phone   Number  (Patient requests to see provider urgently. )? No  Do you have any questions for your primary care provider that need to be   answered prior to your appointment?  Yes

## 2023-02-22 NOTE — Clinical Note
2/22/2023 9:54 PM    Ms.  [facility]  [address]  [place] [address]              Sincerely,      Karen Montes De Oca MD

## 2023-02-22 NOTE — PROGRESS NOTES
Subjective  Arnel Brian is an 23 y.o. female who presents for evaluation of low abdominal pain worse on the right side that started Thursday last week. Over the next several days she developed nausea and vomiting. Pain is localized on right lower area and it doesn't radiate. Went to the ED on February 19 and was dx with UTI. TX with Keflex. She has not tolerated her abxs due to significant nausea and vomiting. Has only tolerated 2 doses of the antibiotics. She denies fever, chills, dysuria, frequency, diarrhea, hx of recurrent UTI, kidney stones, vaginal discharge and vulvar pruritus. She has significant hx of gallstone. She started her period last week. LMP: February 5 -9, restarted again on Februaty 16  This is very unusual for her, she has been on birth control for 10 months, she was not consistent with her pill for one day, took it several hours later. She is sexually active with her boyfriend. She uses OCP for contraception. She denies any drug use. Past Medical History - reviewed:  No past medical history on file. Medications - reviewed:   Current Outpatient Medications   Medication Sig    ondansetron (ZOFRAN ODT) 4 mg disintegrating tablet Take 1 Tablet by mouth every eight (8) hours as needed for Nausea or Vomiting.    trimethoprim-sulfamethoxazole (BACTRIM DS, SEPTRA DS) 160-800 mg per tablet Take 1 Tablet by mouth two (2) times a day for 3 days. sertraline (ZOLOFT) 100 mg tablet Take 1 tablet by mouth once daily    Zoran 24 Fe 1 mg-20 mcg (24)/75 mg (4) tab Take 1 Tablet by mouth daily. methylPREDNISolone (MEDROL DOSEPACK) 4 mg tablet TAKE AS DIRECTED ON PACKAGE (Patient not taking: No sig reported)    hydrOXYzine HCL (ATARAX) 25 mg tablet TAKE 1 TABLET BY MOUTH FOUR TIMES DAILY AS NEEDED FOR ITCHING (Patient not taking: No sig reported)     No current facility-administered medications for this visit.          Past Surgical History - reviewed:   No past surgical history on file.      Social History - reviewed:  Social History     Socioeconomic History    Marital status: SINGLE     Spouse name: Not on file    Number of children: Not on file    Years of education: Not on file    Highest education level: Not on file   Occupational History    Not on file   Tobacco Use    Smoking status: Never    Smokeless tobacco: Never   Substance and Sexual Activity    Alcohol use: No    Drug use: Never    Sexual activity: Never   Other Topics Concern    Not on file   Social History Narrative    Not on file     Social Determinants of Health     Financial Resource Strain: Not on file   Food Insecurity: Not on file   Transportation Needs: Not on file   Physical Activity: Not on file   Stress: Not on file   Social Connections: Not on file   Intimate Partner Violence: Not on file   Housing Stability: Not on file         Family History - reviewed:  No family history on file. Allergies - reviewed:   No Known Allergies    Immunizations - reviewed:   Immunization History   Administered Date(s) Administered    COVID-19, J&J, (age 18y+), IM, 0.5mL 04/07/2021    DTaP 03/03/2003, 05/05/2003, 06/30/2003, 07/19/2004, 08/23/2007    HPV 06/13/2016, 08/24/2016, 03/07/2018    Hep A Vaccine 08/23/2007, 08/29/2008    Hep B Vaccine 03/03/2003, 05/05/2003, 01/12/2004    Hib 03/03/2003, 05/05/2003, 01/12/2004    Influenza Vaccine 10/07/2016    MMR 03/30/2004, 08/23/2007    Meningococcal (MCV4O) Vaccine 10/28/2020    Meningococcal ACWY Vaccine 03/06/2015    Pneumococcal Conjugate (PCV-7) 03/03/2003, 05/05/2003, 10/15/2003, 01/12/2004    Poliovirus vaccine 03/03/2003, 05/05/2003, 07/19/2004, 08/23/2007    Tdap 03/06/2015    Varicella Virus Vaccine 03/30/2004, 08/23/2007         Physical Exam  Visit Vitals  BP 99/69 (BP 1 Location: Left arm, BP Patient Position: Sitting)   Pulse 93   Temp 98.5 °F (36.9 °C)   Resp 16   Wt 167 lb (75.8 kg)   LMP 02/16/2023 (Exact Date)   SpO2 98%   BMI 30.54 kg/m²       General AO x 3.  No distress. Not diaphoretic. No jaundice. No cyanosis. No pallor. Cardio  Normal rate, regular rhythm. No murmur, rubs, or gallop. Pulmonary Effort normal. No accessory muscle use. No wheezes, rales, or rhonchi. Abdominal Soft. Bowel sounds normal. Mild tenderness on RLQ. No rebound or guarding. Dan negative. No CVA tenderness  Extremities No edema of lower extremities. Pulses 2+. MSK  FROM, no joint swelling or tenderness. Neurological CN II-XII grossly intact. No focal deficits. Skin  No rash. US Results (most recent):  Results from [place] encounter on 02/19/23    US TRANSVAGINAL    Narrative  EXAM:  US TRANSVAGINAL    INDICATION:   vaginal bleeding    COMPARISON: None. FINDINGS: Transvaginal sonography was performed with multiple static images of  the uterus and ovaries obtained. The uterus is normal and the endometrial stripe  measures 1.6 mm. The uterus measures 6.8 x 3.8 x 3.1 cm. The right ovary  measures 2.9 x 1.2 x 1.0 cm and the left ovary measures 2.6 x 2.5 x 1.7 cm. There is no fluid or mass or other abnormality in the pelvic cul-de-sac. Impression  Normal transvaginal ultrasound examination.       Lab Results   Component Value Date/Time    WBC 6.3 02/19/2023 02:47 PM    HGB 13.1 02/19/2023 02:47 PM    HCT 38.8 02/19/2023 02:47 PM    PLATELET 584 16/19/9468 02:47 PM    MCV 90.2 02/19/2023 02:47 PM     Lab Results   Component Value Date/Time    Pregnancy test,urine (POC) Negative 02/19/2023 03:39 PM    HCG, Ql. Negative 03/30/2022 07:51 PM        Lab Results   Component Value Date/Time    Sodium 141 02/19/2023 02:47 PM    Potassium 3.8 02/19/2023 02:47 PM    Chloride 110 (H) 02/19/2023 02:47 PM    CO2 27 02/19/2023 02:47 PM    Anion gap 4 (L) 02/19/2023 02:47 PM    Glucose 71 02/19/2023 02:47 PM    BUN 12 02/19/2023 02:47 PM    Creatinine 0.85 02/19/2023 02:47 PM    BUN/Creatinine ratio 14 02/19/2023 02:47 PM    GFR est AA >60 03/30/2022 07:51 PM    GFR est non-AA >60 03/30/2022 07:51 PM    Calcium 8.8 02/19/2023 02:47 PM    Bilirubin, total 0.3 02/19/2023 02:47 PM    Alk. phosphatase 35 (L) 02/19/2023 02:47 PM    Protein, total 7.3 02/19/2023 02:47 PM    Albumin 3.4 (L) 02/19/2023 02:47 PM    Globulin 3.9 02/19/2023 02:47 PM    A-G Ratio 0.9 (L) 02/19/2023 02:47 PM    ALT (SGPT) 24 02/19/2023 02:47 PM    AST (SGOT) 46 (H) 02/19/2023 02:47 PM     Component Ref Range & Units 2/19/23 1535  Resulting Agency   Special Requests:   NO SPECIAL REQUESTS   Reflexed from [id number]  [facility]   Culture result:   MIXED UROGENITAL AFSHAN ISOLATED         Assessment/Plan:    23year old presents for history of lower abdominal pain present for 6 days associated with nausea and several episodes of vomiting. She has not vomited today. ICD-10-CM ICD-9-CM    1. Pelvic pain  R10.2 [id number] MYCOPLASMA GENITALIUM, YOAV, URINE      2. Urinary tract infection without hematuria, site unspecified  N39.0 599.0 URINALYSIS W/ REFLEX CULTURE      CHLAMYDIA / GC-AMPLIFIED      trimethoprim-sulfamethoxazole (BACTRIM DS, SEPTRA DS) 160-800 mg per tablet      3. Nausea and vomiting in adult  R11.2 787.01 ondansetron (ZOFRAN ODT) 4 mg disintegrating tablet        UTI/ Pelvic pain: Her UA c/w infection, although her Urine culture has mixed afshan and only 50 k colonies. She has not experienced typical UTI symptoms. Her recent lab work are not impressive and her vital signs are stable. Denies vaginal discharge. She clinically appears well.    Df diagnosis include PID, UTI, urethritis, appendicitis  - Rechecking UA and UCX, screen for STI checking for C/G/Mycoplasma in UA   - Stop Keflex, start Bactrim for 3 days  - Keep hydrating, monitor symptom improvement in the next 48 -72 hrs RTC for reassement, consider pelvic exam.   - Strict ED precautions discussed with patient and with mother    N & V: will have a trial of zofran, strict ED precautions discussed. Follow-up and Dispositions    Return for return to clinic in 2 to 3 days if no improvement of symptoms . I have discussed the diagnosis with the patient and the intended plan as seen in the above orders. Patient verbalized understanding of the plan and agrees with the plan. The patient has received an after-visit summary and questions were answered concerning future plans. I have discussed medication side effects and warnings with the patient as well. Informed patient to return to the office if new symptoms arise.     Patient discussed with Dr. Monika Hager (Attending physician)      Jon Donald MD  Family Medicine Resident

## 2023-02-22 NOTE — LETTER
NOTIFICATION RETURN TO WORK / SCHOOL    2/22/2023 6:57 PM    Ms. NATALI MIXON  [address] [address]      To Whom It May Concern:    NATALI MIXON is currently under the care of [facility]. She will return to work/school on: February 27, 2023    If there are questions or concerns please have the patient contact our office.         Sincerely,      Aidee Artis MD

## 2023-02-22 NOTE — PROGRESS NOTES
Chief Complaint   Patient presents with    Abdominal Pain     Started thursday    Vomiting     Started Monday night     1. Have you been to the ER, urgent care clinic since your last visit? Hospitalized since your last visit? No    2. Have you seen or consulted any other health care providers outside of the [address] [place] [address] since your last visit? Include any pap smears or colon screening.  No

## 2023-02-24 LAB
APPEARANCE UR: ABNORMAL
BACTERIA #/AREA URNS HPF: NORMAL /[HPF]
BILIRUB UR QL STRIP: NEGATIVE
CASTS URNS QL MICRO: NORMAL /LPF
COLOR UR: YELLOW
EPI CELLS #/AREA URNS HPF: NORMAL /HPF (ref 0–10)
GLUCOSE UR QL STRIP: NEGATIVE
HGB UR QL STRIP: NEGATIVE
KETONES UR QL STRIP: NEGATIVE
LEUKOCYTE ESTERASE UR QL STRIP: NEGATIVE
MICRO URNS: ABNORMAL
NITRITE UR QL STRIP: NEGATIVE
PH UR STRIP: 6 [PH] (ref 5–7.5)
PROT UR QL STRIP: ABNORMAL
RBC #/AREA URNS HPF: NORMAL /HPF (ref 0–2)
SP GR UR STRIP: >=1.03 (ref 1–1.03)
URINALYSIS REFLEX, 377202: ABNORMAL
UROBILINOGEN UR STRIP-MCNC: 1 MG/DL (ref 0.2–1)
WBC #/AREA URNS HPF: NORMAL /HPF (ref 0–5)

## 2023-02-26 LAB
C TRACH RRNA SPEC QL NAA+PROBE: NEGATIVE
N GONORRHOEA RRNA SPEC QL NAA+PROBE: NEGATIVE

## 2023-02-27 ENCOUNTER — TELEPHONE (OUTPATIENT)
Dept: FAMILY MEDICINE CLINIC | Age: 23
End: 2023-02-27

## 2023-02-27 NOTE — TELEPHONE ENCOUNTER
I called patient to discuss lab results and to follow up on symptoms. She is no longer vomiting or having nausea, but continue to have RLQ pain that is localized and non-radiating. Her pain is not constant, it waxes and wanes. She is able to tolerate PO diet. She is still having some menstrual bleeding. I discussed that UA didn't reveal UTI, chlamydia and gonorrhea were negative. Since her pain has not significantly improved, I recommend she calls the office to schedule a follow up appointment. I will send [facility] message to help coordinate.

## 2023-03-01 ENCOUNTER — TELEPHONE (OUTPATIENT)
Dept: FAMILY MEDICINE CLINIC | Age: 23
End: 2023-03-01

## 2023-03-01 NOTE — TELEPHONE ENCOUNTER
----- Message from [facility], MD sent at 3/1/2023  4:22 PM EST -----  Hey,    This patient needs an appointment this week to follow up on pelvic pain. Thanks!

## 2023-03-07 ENCOUNTER — OFFICE VISIT (OUTPATIENT)
Dept: FAMILY MEDICINE CLINIC | Age: 23
End: 2023-03-07
Payer: MEDICAID

## 2023-03-07 VITALS
BODY MASS INDEX: 30 KG/M2 | HEART RATE: 79 BPM | DIASTOLIC BLOOD PRESSURE: 61 MMHG | WEIGHT: 164 LBS | OXYGEN SATURATION: 98 % | SYSTOLIC BLOOD PRESSURE: 91 MMHG | TEMPERATURE: 97.6 F

## 2023-03-07 DIAGNOSIS — N73.9 PELVIC INFLAMMATORY DISEASE: Primary | ICD-10-CM

## 2023-03-07 DIAGNOSIS — N93.9 ABNORMAL UTERINE BLEEDING: ICD-10-CM

## 2023-03-07 LAB
HCG URINE, QL. (POC): NEGATIVE
VALID INTERNAL CONTROL?: YES

## 2023-03-07 PROCEDURE — 96372 THER/PROPH/DIAG INJ SC/IM: CPT | Performed by: FAMILY MEDICINE

## 2023-03-07 RX ORDER — METRONIDAZOLE 500 MG/1
500 TABLET ORAL 2 TIMES DAILY
Qty: 28 TABLET | Refills: 0 | Status: SHIPPED | OUTPATIENT
Start: 2023-03-07 | End: 2023-03-21

## 2023-03-07 RX ORDER — DOXYCYCLINE 100 MG/1
100 TABLET ORAL 2 TIMES DAILY
Qty: 28 TABLET | Refills: 0 | Status: SHIPPED | OUTPATIENT
Start: 2023-03-07 | End: 2023-03-21

## 2023-03-07 RX ORDER — CEFTRIAXONE 500 MG/1
500 INJECTION, POWDER, FOR SOLUTION INTRAMUSCULAR; INTRAVENOUS EVERY 24 HOURS
Status: DISCONTINUED | OUTPATIENT
Start: 2023-03-07 | End: 2023-03-07

## 2023-03-07 RX ORDER — CEFTRIAXONE 500 MG/1
500 INJECTION, POWDER, FOR SOLUTION INTRAMUSCULAR; INTRAVENOUS ONCE
Status: COMPLETED | OUTPATIENT
Start: 2023-03-07 | End: 2023-03-07

## 2023-03-07 RX ADMIN — CEFTRIAXONE 500 MG: 500 INJECTION, POWDER, FOR SOLUTION INTRAMUSCULAR; INTRAVENOUS at 09:14

## 2023-03-07 NOTE — PROGRESS NOTES
[address] Medicine Residency   [address] [facility]   Tel: [phone]  Fax: [phone]    Chief Complaint:   Chief Complaint   Patient presents with    Pelvic Pain     Pelvic pain 5 out 10 , intermittent vaginal bleeding for 3 weeks, presence of  bright red blood clots. has some spotting today. Evens Truong is an 23 y.o. female who presents for follow up on right pelvic pain. Present for several weeks. Described as a 5 out 10 in intensity. She has had no more nausea and vomiting. She denies fever, chills, diarrhea, constipation, dysuria or hematuria. She denies any pain with sexual activity. No vaginal odor or vaginal discharge. Right pelvic pain is described as dull . and sharp, sometimes it can radiate towards the back. It improves with laying still  Pain worsens with none    LMP: February 5 -9, restarted again on Februaty 16 She is still bleeding intermittently since her period started. Her bleeding stopped on Monday and started again on Friday of last week. The following studies reviewed:  US Results (most recent):  Results from Hospital Encounter encounter on 02/19/23    US TRANSVAGINAL    Narrative  EXAM:  US TRANSVAGINAL    INDICATION:   vaginal bleeding    COMPARISON: None. FINDINGS: Transvaginal sonography was performed with multiple static images of  the uterus and ovaries obtained. The uterus is normal and the endometrial stripe  measures 1.6 mm. The uterus measures 6.8 x 3.8 x 3.1 cm. The right ovary  measures 2.9 x 1.2 x 1.0 cm and the left ovary measures 2.6 x 2.5 x 1.7 cm. There is no fluid or mass or other abnormality in the pelvic cul-de-sac. Impression  Normal transvaginal ultrasound examination.     Lab Results   Component Value Date/Time    WBC 6.3 02/19/2023 02:47 PM    HGB 13.1 02/19/2023 02:47 PM    HCT 38.8 02/19/2023 02:47 PM    PLATELET 957 23/85/4515 02:47 PM    MCV 90.2 02/19/2023 02:47 PM     Lab Results   Component Value Date/Time    Pregnancy test,urine (POC) Negative 02/19/2023 03:39 PM    HCG, Ql. Negative 03/30/2022 07:51 PM    HCG urine, Ql. (POC) Negative 03/07/2023 09:08 AM     Lab Results   Component Value Date/Time    Sodium 141 02/19/2023 02:47 PM    Potassium 3.8 02/19/2023 02:47 PM    Chloride 110 (H) 02/19/2023 02:47 PM    CO2 27 02/19/2023 02:47 PM    Anion gap 4 (L) 02/19/2023 02:47 PM    Glucose 71 02/19/2023 02:47 PM    BUN 12 02/19/2023 02:47 PM    Creatinine 0.85 02/19/2023 02:47 PM    BUN/Creatinine ratio 14 02/19/2023 02:47 PM    GFR est AA >60 03/30/2022 07:51 PM    GFR est non-AA >60 03/30/2022 07:51 PM    Calcium 8.8 02/19/2023 02:47 PM    Bilirubin, total 0.3 02/19/2023 02:47 PM    Alk. phosphatase 35 (L) 02/19/2023 02:47 PM    Protein, total 7.3 02/19/2023 02:47 PM    Albumin 3.4 (L) 02/19/2023 02:47 PM    Globulin 3.9 02/19/2023 02:47 PM    A-G Ratio 0.9 (L) 02/19/2023 02:47 PM    ALT (SGPT) 24 02/19/2023 02:47 PM    AST (SGOT) 46 (H) 02/19/2023 02:47 PM       ROS:   Review of Systems   All other systems reviewed and are negative. Review of Systems   Constitutional:  Negative for chills and fever. Cardiovascular:  Negative for chest pain. Gastrointestinal:  Positive for abdominal pain. Past Medical History - reviewed:  No past medical history on file. Medications - reviewed:   Current Outpatient Medications   Medication Sig    doxycycline (ADOXA) 100 mg tablet Take 1 Tablet by mouth two (2) times a day for 14 days. Indications: pid    metroNIDAZOLE (FLAGYL) 500 mg tablet Take 1 Tablet by mouth two (2) times a day for 14 days. Indications: PID    sertraline (ZOLOFT) 100 mg tablet Take 1 tablet by mouth once daily    Zoran 24 Fe 1 mg-20 mcg (24)/75 mg (4) tab Take 1 Tablet by mouth daily. ondansetron (ZOFRAN ODT) 4 mg disintegrating tablet Take 1 Tablet by mouth every eight (8) hours as needed for Nausea or Vomiting.  (Patient not taking: Reported on 3/7/2023)    methylPREDNISolone (MEDROL DOSEPACK) 4 mg tablet TAKE AS DIRECTED ON PACKAGE (Patient not taking: No sig reported)    hydrOXYzine HCL (ATARAX) 25 mg tablet TAKE 1 TABLET BY MOUTH FOUR TIMES DAILY AS NEEDED FOR ITCHING (Patient not taking: No sig reported)     No current facility-administered medications for this visit. Past Surgical History - reviewed:   No past surgical history on file. Social History - reviewed:  Social History     Socioeconomic History    Marital status: SINGLE     Spouse name: Not on file    Number of children: Not on file    Years of education: Not on file    Highest education level: Not on file   Occupational History    Not on file   Tobacco Use    Smoking status: Never    Smokeless tobacco: Never   Substance and Sexual Activity    Alcohol use: No    Drug use: Never    Sexual activity: Never   Other Topics Concern    Not on file   Social History Narrative    Not on file     Social Determinants of Health     Financial Resource Strain: Not on file   Food Insecurity: Not on file   Transportation Needs: Not on file   Physical Activity: Not on file   Stress: Not on file   Social Connections: Not on file   Intimate Partner Violence: Not on file   Housing Stability: Not on file         Family History - reviewed:  No family history on file.     Allergies - reviewed:   No Known Allergies    Immunizations - reviewed:   Immunization History   Administered Date(s) Administered    COVID-19, J&J, (age 18y+), IM, 0.5mL 04/07/2021    DTaP 03/03/2003, 05/05/2003, 06/30/2003, 07/19/2004, 08/23/2007    HPV 06/13/2016, 08/24/2016, 03/07/2018    Hep A Vaccine 08/23/2007, 08/29/2008    Hep B Vaccine 03/03/2003, 05/05/2003, 01/12/2004    Hib 03/03/2003, 05/05/2003, 01/12/2004    Influenza Vaccine 10/07/2016    MMR 03/30/2004, 08/23/2007    Meningococcal (MCV4O) Vaccine 10/28/2020    Meningococcal ACWY Vaccine 03/06/2015    Pneumococcal Conjugate (PCV-7) 03/03/2003, 05/05/2003, 10/15/2003, 01/12/2004    Poliovirus vaccine 03/03/2003, 05/05/2003, 07/19/2004, 08/23/2007    Tdap 03/06/2015    Varicella Virus Vaccine 03/30/2004, 08/23/2007         Physical Exam  Visit Vitals  BP 91/61   Pulse 79   Temp 97.6 °F (36.4 °C)   Wt 164 lb (74.4 kg)   LMP 02/16/2023 (Exact Date)   SpO2 98%   BMI 30.00 kg/m²       Physical Exam  Exam conducted with a chaperone present. Constitutional:       General: She is not in acute distress. Appearance: Normal appearance. She is not ill-appearing or toxic-appearing. Cardiovascular:      Rate and Rhythm: Normal rate and regular rhythm. Heart sounds: Normal heart sounds. Pulmonary:      Effort: Pulmonary effort is normal.      Breath sounds: Normal breath sounds. Abdominal:      General: There is no distension. Palpations: There is no mass. Tenderness: There is abdominal tenderness in the right lower quadrant. There is no guarding or rebound. Negative signs include Dan's sign and obturator sign. Genitourinary:     Vagina: No signs of injury. Vaginal discharge present. Cervix: Friability present. Uterus: Tender. Adnexa:         Right: Tenderness present. Rectum: Normal.      Comments: Yellow discharge from cervical os  Strong fishy smell   Neurological:      Mental Status: She is alert.         Assessment/Plan    Luzmaria Loyola is an 23 y.o. female who presents for right sided pelvic pain      ICD-10-CM ICD-9-CM    1. Pelvic inflammatory disease  N73.9 614.9 AMB POC SMEAR, STAIN & INTERPRET, WET MOUNT      CT/NG/T.VAGINALIS AMPLIFICATION      doxycycline (ADOXA) 100 mg tablet      metroNIDAZOLE (FLAGYL) 500 mg tablet      HIV 1/2 AG/AB, 4TH GENERATION,W RFLX CONFIRM      RPR      HEPATITIS C AB      cefTRIAXone (ROCEPHIN) injection 500 mg      HEPATITIS C AB      RPR      HIV 1/2 AG/AB, 4TH GENERATION,W RFLX CONFIRM      CT/NG/T.VAGINALIS AMPLIFICATION      DISCONTINUED: cefTRIAXone (ROCEPHIN) injection 500 mg      2. Abnormal uterine bleeding  N93.9 626.9 AMB POC URINE PREGNANCY TEST, VISUAL COLOR COMPARISON        PID: presence of cervical yellowish discharge and adnexal tenderness. Checking for C/G, Trich. Tx with   Ceftriaxone 500 mg IM x 1, Flagyl 500 mg bid for 14 days and Doxy 100 mg BID x 14 days. - Testing for STI, recommend partner be evaluated. I offered expedite partner tx which was declined. - Advised patient to send [employer] message in 48 to 72 hrs to monitor symptoms.   - Avoid etoh while on Flagyl    Abnormal uterine bleeding: I reviewed recent IV US which was normal. UPT negative, likely 2/2 to above. Follow-up and Dispositions    Return in about 3 days (around 3/10/2023), or if symptoms worsen or fail to improve. I have discussed the diagnosis with the patient and the intended plan as seen in the above orders. Patient verbalized understanding of the plan and agrees with the plan. The patient has received an after-visit summary and questions were answered concerning future plans. I have discussed medication side effects and warnings with the patient as well. Informed patient to return to the office if new symptoms arise.     Patient discussed with Dr. Emmanuel King (Attending Physician)     Ernie Tyler MD  Family Medicine Resident

## 2023-03-08 ENCOUNTER — TELEPHONE (OUTPATIENT)
Dept: FAMILY MEDICINE CLINIC | Age: 23
End: 2023-03-08

## 2023-03-08 DIAGNOSIS — A49.3 INFECTION DUE TO MYCOPLASMA GENITALIUM: Primary | ICD-10-CM

## 2023-03-08 LAB
HCV IGG SERPL QL IA: NON REACTIVE
HIV 1+2 AB+HIV1 P24 AG SERPL QL IA: NON REACTIVE
RPR SER QL: NON REACTIVE

## 2023-03-08 RX ORDER — MOXIFLOXACIN HYDROCHLORIDE 400 MG/1
400 TABLET ORAL DAILY
Qty: 7 TABLET | Refills: 0 | Status: SHIPPED | OUTPATIENT
Start: 2023-03-08 | End: 2023-03-15

## 2023-03-08 NOTE — TELEPHONE ENCOUNTER
She stated she is amending the report that was sent out 02/28. The results were originally negative; however, the results were positive. New results will be posted today. If you have any questions, her extension is [id number]. Thank you.

## 2023-03-09 NOTE — TELEPHONE ENCOUNTER
I called patient to discuss lab results. I received a message from lab rosendo Michelle Delgado about erroneous reports of Mycoplasma Genitalium YOAV. I spoke with the lab rosendo personal, they initially reported results  as negative, later corrected and reported as positive. Given the increasing rate of resistance for Mycoplasma Genitalium, I will treat it as a resistant strain. I will send Moxifloxacin 400 mg PO Daily for 7 days for patient to start after completing treatment with Doxycycline. This was all related and explain to the patient on the phone. All questions were answered.

## 2023-03-09 NOTE — PROGRESS NOTES
Urine pregnancy test negative  RPR, HIV and Hepatitis C negative  Results discussed with patient over the phone

## 2023-03-10 LAB
C TRACH RRNA SPEC QL NAA+PROBE: NEGATIVE
N GONORRHOEA RRNA SPEC QL NAA+PROBE: NEGATIVE
T VAGINALIS RRNA SPEC QL NAA+PROBE: NEGATIVE

## 2023-03-29 ENCOUNTER — TELEPHONE (OUTPATIENT)
Dept: FAMILY MEDICINE CLINIC | Age: 23
End: 2023-03-29

## 2023-03-29 NOTE — TELEPHONE ENCOUNTER
Pt requested a returned phone call to advise her if she could take Moxifloxacin while taking Eliquis.     Thank you

## 2023-03-30 ENCOUNTER — PATIENT MESSAGE (OUTPATIENT)
Dept: FAMILY MEDICINE CLINIC | Age: 23
End: 2023-03-30

## 2023-03-31 NOTE — TELEPHONE ENCOUNTER
Returned called to patient. Patient had ankle repair and will be on Eliquis for 2 weeks. No interactions found with Moxifloxacin and she should be able to complete her antibiotics.

## 2023-07-31 SDOH — ECONOMIC STABILITY: FOOD INSECURITY: WITHIN THE PAST 12 MONTHS, THE FOOD YOU BOUGHT JUST DIDN'T LAST AND YOU DIDN'T HAVE MONEY TO GET MORE.: NEVER TRUE

## 2023-07-31 SDOH — ECONOMIC STABILITY: HOUSING INSECURITY
IN THE LAST 12 MONTHS, WAS THERE A TIME WHEN YOU DID NOT HAVE A STEADY PLACE TO SLEEP OR SLEPT IN A SHELTER (INCLUDING NOW)?: NO

## 2023-07-31 SDOH — ECONOMIC STABILITY: TRANSPORTATION INSECURITY
IN THE PAST 12 MONTHS, HAS LACK OF TRANSPORTATION KEPT YOU FROM MEETINGS, WORK, OR FROM GETTING THINGS NEEDED FOR DAILY LIVING?: NO

## 2023-07-31 SDOH — ECONOMIC STABILITY: FOOD INSECURITY: WITHIN THE PAST 12 MONTHS, YOU WORRIED THAT YOUR FOOD WOULD RUN OUT BEFORE YOU GOT MONEY TO BUY MORE.: SOMETIMES TRUE

## 2023-07-31 SDOH — ECONOMIC STABILITY: INCOME INSECURITY: HOW HARD IS IT FOR YOU TO PAY FOR THE VERY BASICS LIKE FOOD, HOUSING, MEDICAL CARE, AND HEATING?: SOMEWHAT HARD

## 2023-08-01 ENCOUNTER — OFFICE VISIT (OUTPATIENT)
Age: 23
End: 2023-08-01
Payer: MEDICAID

## 2023-08-01 VITALS
RESPIRATION RATE: 18 BRPM | SYSTOLIC BLOOD PRESSURE: 126 MMHG | WEIGHT: 165 LBS | OXYGEN SATURATION: 99 % | TEMPERATURE: 98 F | HEIGHT: 62 IN | DIASTOLIC BLOOD PRESSURE: 88 MMHG | BODY MASS INDEX: 30.36 KG/M2 | HEART RATE: 88 BPM

## 2023-08-01 DIAGNOSIS — F32.A DEPRESSION, UNSPECIFIED DEPRESSION TYPE: ICD-10-CM

## 2023-08-01 DIAGNOSIS — Z00.00 ANNUAL PHYSICAL EXAM: Primary | ICD-10-CM

## 2023-08-01 PROCEDURE — 99214 OFFICE O/P EST MOD 30 MIN: CPT

## 2023-08-01 RX ORDER — BUPROPION HYDROCHLORIDE 150 MG/1
150 TABLET ORAL EVERY MORNING
Qty: 30 TABLET | Refills: 0 | Status: SHIPPED | OUTPATIENT
Start: 2023-08-01

## 2023-08-01 ASSESSMENT — ENCOUNTER SYMPTOMS
CHEST TIGHTNESS: 0
NAUSEA: 1
PHOTOPHOBIA: 0
ABDOMINAL PAIN: 0
TROUBLE SWALLOWING: 0
SHORTNESS OF BREATH: 0
VOICE CHANGE: 0
DIARRHEA: 0

## 2023-08-01 ASSESSMENT — PATIENT HEALTH QUESTIONNAIRE - PHQ9
SUM OF ALL RESPONSES TO PHQ QUESTIONS 1-9: 4
SUM OF ALL RESPONSES TO PHQ9 QUESTIONS 1 & 2: 4
1. LITTLE INTEREST OR PLEASURE IN DOING THINGS: 2
2. FEELING DOWN, DEPRESSED OR HOPELESS: 2

## 2023-08-01 NOTE — PROGRESS NOTES
Rea Oneill is a 23 y.o. female who presents for annual physical exam and a anxiety & depression follow up. Zoloft 100mg stopped in Dec to to SE of downiness midday, pt time to time still feels unmotivated. Would like to explore other options. Review of Systems   Review of Systems   Constitutional:  Positive for appetite change (Pt doesnt hungry often) and fatigue. Negative for activity change and unexpected weight change. HENT:  Negative for trouble swallowing and voice change. Eyes:  Negative for photophobia and visual disturbance. Respiratory:  Negative for chest tightness and shortness of breath. Cardiovascular:  Negative for chest pain and palpitations. Gastrointestinal:  Positive for nausea (most morning after waking up, resolves on its own). Negative for abdominal pain and diarrhea. Genitourinary:  Negative for dysuria, menstrual problem and pelvic pain. Musculoskeletal:  Negative for myalgias, neck pain and neck stiffness. Skin:  Negative for pallor and wound. Neurological:  Negative for dizziness, weakness and light-headedness. Psychiatric/Behavioral:  Positive for sleep disturbance (hard to fall asleep). Negative for self-injury and suicidal ideas. The patient is nervous/anxious (at times). Medical History  Past Medical History:   Diagnosis Date    Anxiety 5//12/19    Depression 5/12/19       Medications  Current Outpatient Medications   Medication Sig    Norethin Ace-Eth Estrad-FE (ANNMARIE 24 FE) 1-20 MG-MCG(24) TABS Take 1 tablet by mouth daily    sertraline (ZOLOFT) 100 MG tablet Take 1 tablet by mouth once daily (Patient not taking: Reported on 8/1/2023)     No current facility-administered medications for this visit.        Immunizations   Immunization History   Administered Date(s) Administered    COVID-19, J&J, (age 18y+), IM, 0.5 mL 04/07/2021    DTaP, INFANRIX, (age 6w-6y), IM, 0.5mL 03/03/2003, 05/05/2003, 06/30/2003, 07/19/2004, 08/23/2007    HPV

## 2023-08-02 LAB
BUN SERPL-MCNC: 9 MG/DL (ref 6–20)
BUN/CREAT SERPL: 12 (ref 9–23)
CALCIUM SERPL-MCNC: 9.5 MG/DL (ref 8.7–10.2)
CHLORIDE SERPL-SCNC: 105 MMOL/L (ref 96–106)
CHOLEST SERPL-MCNC: 231 MG/DL (ref 100–199)
CO2 SERPL-SCNC: 23 MMOL/L (ref 20–29)
CREAT SERPL-MCNC: 0.78 MG/DL (ref 0.57–1)
EGFRCR SERPLBLD CKD-EPI 2021: 111 ML/MIN/1.73
ERYTHROCYTE [DISTWIDTH] IN BLOOD BY AUTOMATED COUNT: 11.8 % (ref 11.7–15.4)
GLUCOSE SERPL-MCNC: 78 MG/DL (ref 70–99)
HBA1C MFR BLD: 4.7 % (ref 4.8–5.6)
HCT VFR BLD AUTO: 40 % (ref 34–46.6)
HDLC SERPL-MCNC: 52 MG/DL
HGB BLD-MCNC: 13.6 G/DL (ref 11.1–15.9)
IMP & REVIEW OF LAB RESULTS: NORMAL
LDLC SERPL CALC-MCNC: 151 MG/DL (ref 0–99)
MCH RBC QN AUTO: 30.8 PG (ref 26.6–33)
MCHC RBC AUTO-ENTMCNC: 34 G/DL (ref 31.5–35.7)
MCV RBC AUTO: 91 FL (ref 79–97)
PLATELET # BLD AUTO: 273 X10E3/UL (ref 150–450)
POTASSIUM SERPL-SCNC: 4.7 MMOL/L (ref 3.5–5.2)
RBC # BLD AUTO: 4.41 X10E6/UL (ref 3.77–5.28)
SODIUM SERPL-SCNC: 140 MMOL/L (ref 134–144)
TRIGL SERPL-MCNC: 156 MG/DL (ref 0–149)
TSH SERPL DL<=0.005 MIU/L-ACNC: 1.31 UIU/ML (ref 0.45–4.5)
VLDLC SERPL CALC-MCNC: 28 MG/DL (ref 5–40)
WBC # BLD AUTO: 5.5 X10E3/UL (ref 3.4–10.8)